# Patient Record
Sex: MALE | Race: BLACK OR AFRICAN AMERICAN | NOT HISPANIC OR LATINO | Employment: STUDENT | URBAN - METROPOLITAN AREA
[De-identification: names, ages, dates, MRNs, and addresses within clinical notes are randomized per-mention and may not be internally consistent; named-entity substitution may affect disease eponyms.]

---
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
## 2021-04-16 ENCOUNTER — PATIENT MESSAGE (OUTPATIENT)
Dept: RESEARCH | Facility: HOSPITAL | Age: 21
End: 2021-04-16

## 2021-05-14 ENCOUNTER — TELEPHONE (OUTPATIENT)
Dept: ORTHOPEDICS | Facility: CLINIC | Age: 21
End: 2021-05-14

## 2023-03-16 ENCOUNTER — OFFICE VISIT (OUTPATIENT)
Dept: SPORTS MEDICINE | Facility: CLINIC | Age: 23
End: 2023-03-16
Payer: MEDICAID

## 2023-03-16 ENCOUNTER — HOSPITAL ENCOUNTER (OUTPATIENT)
Dept: RADIOLOGY | Facility: HOSPITAL | Age: 23
Discharge: HOME | End: 2023-03-16
Attending: FAMILY MEDICINE
Payer: MEDICAID

## 2023-03-16 VITALS
BODY MASS INDEX: 28.23 KG/M2 | DIASTOLIC BLOOD PRESSURE: 82 MMHG | HEIGHT: 70 IN | SYSTOLIC BLOOD PRESSURE: 126 MMHG | HEART RATE: 63 BPM | WEIGHT: 197.19 LBS

## 2023-03-16 DIAGNOSIS — M79.641 RIGHT HAND PAIN: ICD-10-CM

## 2023-03-16 DIAGNOSIS — M79.645 CHRONIC THUMB PAIN, LEFT: Primary | ICD-10-CM

## 2023-03-16 DIAGNOSIS — G89.29 CHRONIC THUMB PAIN, LEFT: Primary | ICD-10-CM

## 2023-03-16 DIAGNOSIS — M79.642 LEFT HAND PAIN: ICD-10-CM

## 2023-03-16 DIAGNOSIS — R29.898 DECREASED GRIP STRENGTH OF LEFT HAND: ICD-10-CM

## 2023-03-16 PROCEDURE — 3079F PR MOST RECENT DIASTOLIC BLOOD PRESSURE 80-89 MM HG: ICD-10-PCS | Mod: CPTII,,, | Performed by: FAMILY MEDICINE

## 2023-03-16 PROCEDURE — 3008F BODY MASS INDEX DOCD: CPT | Mod: CPTII,,, | Performed by: FAMILY MEDICINE

## 2023-03-16 PROCEDURE — 73130 X-RAY EXAM OF HAND: CPT | Mod: 26,LT,, | Performed by: RADIOLOGY

## 2023-03-16 PROCEDURE — 99213 OFFICE O/P EST LOW 20 MIN: CPT | Mod: PBBFAC | Performed by: FAMILY MEDICINE

## 2023-03-16 PROCEDURE — 99214 OFFICE O/P EST MOD 30 MIN: CPT | Mod: S$PBB,,, | Performed by: FAMILY MEDICINE

## 2023-03-16 PROCEDURE — 3074F PR MOST RECENT SYSTOLIC BLOOD PRESSURE < 130 MM HG: ICD-10-PCS | Mod: CPTII,,, | Performed by: FAMILY MEDICINE

## 2023-03-16 PROCEDURE — 3079F DIAST BP 80-89 MM HG: CPT | Mod: CPTII,,, | Performed by: FAMILY MEDICINE

## 2023-03-16 PROCEDURE — 73130 XR HAND COMPLETE 3 VIEW LEFT: ICD-10-PCS | Mod: 26,LT,, | Performed by: RADIOLOGY

## 2023-03-16 PROCEDURE — 99999 PR PBB SHADOW E&M-EST. PATIENT-LVL III: ICD-10-PCS | Mod: PBBFAC,,, | Performed by: FAMILY MEDICINE

## 2023-03-16 PROCEDURE — 1160F RVW MEDS BY RX/DR IN RCRD: CPT | Mod: CPTII,,, | Performed by: FAMILY MEDICINE

## 2023-03-16 PROCEDURE — 3074F SYST BP LT 130 MM HG: CPT | Mod: CPTII,,, | Performed by: FAMILY MEDICINE

## 2023-03-16 PROCEDURE — 73130 X-RAY EXAM OF HAND: CPT | Mod: TC,LT

## 2023-03-16 PROCEDURE — 99214 PR OFFICE/OUTPT VISIT, EST, LEVL IV, 30-39 MIN: ICD-10-PCS | Mod: S$PBB,,, | Performed by: FAMILY MEDICINE

## 2023-03-16 PROCEDURE — 99999 PR PBB SHADOW E&M-EST. PATIENT-LVL III: CPT | Mod: PBBFAC,,, | Performed by: FAMILY MEDICINE

## 2023-03-16 PROCEDURE — 3008F PR BODY MASS INDEX (BMI) DOCUMENTED: ICD-10-PCS | Mod: CPTII,,, | Performed by: FAMILY MEDICINE

## 2023-03-16 PROCEDURE — 1160F PR REVIEW ALL MEDS BY PRESCRIBER/CLIN PHARMACIST DOCUMENTED: ICD-10-PCS | Mod: CPTII,,, | Performed by: FAMILY MEDICINE

## 2023-03-16 PROCEDURE — 1159F PR MEDICATION LIST DOCUMENTED IN MEDICAL RECORD: ICD-10-PCS | Mod: CPTII,,, | Performed by: FAMILY MEDICINE

## 2023-03-16 PROCEDURE — 1159F MED LIST DOCD IN RCRD: CPT | Mod: CPTII,,, | Performed by: FAMILY MEDICINE

## 2023-03-16 RX ORDER — MELOXICAM 7.5 MG/1
7.5 TABLET ORAL DAILY
Qty: 15 TABLET | Refills: 0 | Status: SHIPPED | OUTPATIENT
Start: 2023-03-16

## 2023-03-16 NOTE — PROGRESS NOTES
Pedro Morales, a 22 y.o. male, is here for evaluation of hand pain / 1st digit (thumb) left    HISTORY OF PRESENT ILLNESS  Hand dominance: right    Location: 1st digit (thumb)   Onset: sudden   Palliative:    Relative rest   Oral analgesics     Provocative:   Active RoM  Prior:   Progression: plateau discomfort  Quality: sharp    Radiation: Denies neck pain, numbness, and tingling in fingertips.  Severity: per nursing documentation  Timing: intermittent w/ use  Trauma:     Review of systems (ROS):  A 10+ review of systems was performed with pertinent positives and negatives noted above in the history of present illness. Other systems were negative unless otherwise specified.    PHYSICAL EXAMINATION  General:  The patient is alert and oriented x 3. Mood is pleasant. Observation of ears, eyes and nose reveal no gross abnormalities. HEENT: NCAT, sclera anicteric. Lungs: Respirations are equal and unlabored.    Wrist/Hand Exam/1st digit (thumb)    Observation:     Swelling  none  Deformity  none   Discoloration  none   Atrophy  none   Scars   none             Erythema                    none    Tenderness:  Radial:  DRUJ    Neg  Radial Styloid   Neg  Radial Shaft                            Neg  1st dorsal Compartment Neg  Jonelle's Tubercle  Neg  Basal Joint Thumb  Neg  ECRL Tendon   Neg  FCR Tendon   Neg  Snuff Box   Neg  Lunate    Neg     Ulnar:  ECU Sheath   Neg  FCU Tendon   Neg  Pisiform   Neg  TFCC    Neg  Ulnar Styloid   Neg  Ulnar Shaft   Neg    Hand:  Palmar Fascia   Neg  Metacarpal   Neg  MCP    Neg  Phalanx   Neg  PIP    Neg  DIP    Neg  A1- Pulley   Neg  Flexor Tendons  Neg  Finger Tip   Neg    ROM: (AROM)  Right    Left        Wrist Flexion   80°       80°     Wrist Extension   75°      75°  Radial Deviation  30°     30°   Ulnar Deviation  30°      30°     Pronation   90     90  Supination   90    90    Strength:   RIGHT    LEFT   Wrist Flexion   5/5    5/5   Wrist Extension  5/5    5/5  Hand     5/5    5/5  Opposition   5/5    5/5    Special tests:  Phalens     Neg  Durkan Carpal Compression   Neg  Tinel (wrist)     Neg  Finkelstein     Neg  Piano Cooper (ulnar translation)   Neg  Bass Scaphoid Shift   Neg  Richard Test     Normal  Froment Sign     Neg  CMC Grind     Neg  Melany (Intrinsic Tightness)   Neg     Extremity Neuro-vascular Testing: Sensation grossly intact to light touch all dermatomal regions. DTR 2+ Biceps, Triceps, BR and Negative Vick's sign. Grossly intact motor function at Elbow, Wrist and Hand. Distal pulses radial and ulnar 2+, brisk cap refill, symmetric.    Other Findings:    ASSESSMENT & PLAN   Assessment  #1 1st digit (thumb), MCP joint, left /nd   Traumatic   Failing conservative therapies   W/out instability    No evidence of osseous pathology  No evidence of neurologic pathology  No evidence of vascular pathology    Imaging studies reviewed:   X-ray wrist/hand, left 23.03    Plan  Given extreme dysfunction and discomfort, and non-diagnostic x-ray imaging, we will obtain MRI imaging for further evaluation of the soft tissue structures of the LEFT 1ST MCP JOINT.    We discussed options including    Watchful waiting / relative rest x   Physical therapy x   Injection therapy    Consultation    The patient chooses As above   x = prescribed  CSI = corticosteroid injection  VSI = viscosupplement injection  PRPI = platelet rich plasma injection  ia = intra articular  R = right  L = left  B = bilateral   nfSx = surgical consultation was recommended, but patient is not interested in consultation at this time    Physical Therapy        Formal (fPT), @ [facility] facility    Formal (fPT), @ [facility] facility        Homegoing (hgPT), per concurrent fPT recommendations    Homegoing (hgPT), per prior fPT recommendations    Homegoing (hgPT), handout provided        w/  (atPT) t   [blank] = not prescribed  x = prescribed  b = prescribed, and begin as indicated  t = continue as  indicated  r = prescribed, and restart as indicated  p = completed prior as indicated  hs = prescribed, and with high school   col = prescribed, and with college or university   nfPT = physical therapy was recommended, but patient is not interested in PT at this time    Activity (e.g. sports, work) restrictions    [blank] = as tolerated  pt = per physical therapist  at = per     Bracing Thumb spica brace, f   [blank] = not prescribed  r = recommended, but not fit with at todays visit  f = prescribed and fit with at todays visit  t = continue as indicated  p = prn use on rare, as-needed basis; advised against chronic use    Pain management m   [blank] = No prescription necessary. A handout detailing dosing of appropriate   over-the-counter musculoskeletal analgesics was made available to the patient.   m = meloxicam x 14 days  mp = 14 day course of meloxicam prescribed prior    Follow up mri   [blank] = as needed  [number] = in [number] weeks  CSI = for corticosteroid injection  VSI = for viscosupplement injection or injection series  PRP = for platelet rich plasma injection or injection series  MRI = after MRI imaging  ns = should surgical options be deferred (no surgery)  o = appointment offered, deferred by patient    Should symptoms worsen or fail to resolve, consider    Revisiting the above options and / or      Vocation:

## 2023-05-22 ENCOUNTER — TELEPHONE (OUTPATIENT)
Dept: SPORTS MEDICINE | Facility: CLINIC | Age: 23
End: 2023-05-22
Payer: MEDICAID

## 2023-05-22 NOTE — TELEPHONE ENCOUNTER
----- Message from Martha Curry Cortez sent at 5/22/2023 10:32 AM CDT -----  Regarding: appt; f/u  Contact: April (mom) @ [phone]  Jean-Paul (dad) called in to schedule an appt; no available appts in Epic. Pt is asking for a call back soon to schedule. Thanks.    Reason appt not schedule: none available in Epic    Patient's DX: n/a    Patient requesting call back or [facility] msg: please contact April (mom) by phone to r/s.

## 2023-07-11 ENCOUNTER — PATIENT MESSAGE (OUTPATIENT)
Dept: RESEARCH | Facility: HOSPITAL | Age: 23
End: 2023-07-11
Payer: MEDICAID

## 2023-10-20 ENCOUNTER — OFFICE VISIT (OUTPATIENT)
Dept: SPORTS MEDICINE | Facility: CLINIC | Age: 23
End: 2023-10-20
Payer: MEDICAID

## 2023-10-20 ENCOUNTER — HOSPITAL ENCOUNTER (OUTPATIENT)
Dept: RADIOLOGY | Facility: HOSPITAL | Age: 23
Discharge: HOME | End: 2023-10-20
Attending: PHYSICIAN ASSISTANT
Payer: MEDICAID

## 2023-10-20 VITALS
BODY MASS INDEX: 26.12 KG/M2 | WEIGHT: 182.44 LBS | HEART RATE: 66 BPM | SYSTOLIC BLOOD PRESSURE: 117 MMHG | DIASTOLIC BLOOD PRESSURE: 72 MMHG | HEIGHT: 70 IN

## 2023-10-20 DIAGNOSIS — M25.561 RIGHT KNEE PAIN, UNSPECIFIED CHRONICITY: ICD-10-CM

## 2023-10-20 DIAGNOSIS — M21.862 HYPEREXTENSION DEFORMITY OF KNEE, LEFT: Primary | ICD-10-CM

## 2023-10-20 DIAGNOSIS — M25.562 LEFT KNEE PAIN, UNSPECIFIED CHRONICITY: ICD-10-CM

## 2023-10-20 DIAGNOSIS — M25.562 ACUTE PAIN OF LEFT KNEE: ICD-10-CM

## 2023-10-20 PROCEDURE — 73564 X-RAY EXAM KNEE 4 OR MORE: CPT | Mod: TC,50

## 2023-10-20 PROCEDURE — 3074F PR MOST RECENT SYSTOLIC BLOOD PRESSURE < 130 MM HG: ICD-10-PCS | Mod: CPTII,,, | Performed by: PHYSICIAN ASSISTANT

## 2023-10-20 PROCEDURE — 99999 PR PBB SHADOW E&M-EST. PATIENT-LVL III: CPT | Mod: PBBFAC,,, | Performed by: PHYSICIAN ASSISTANT

## 2023-10-20 PROCEDURE — 3008F PR BODY MASS INDEX (BMI) DOCUMENTED: ICD-10-PCS | Mod: CPTII,,, | Performed by: PHYSICIAN ASSISTANT

## 2023-10-20 PROCEDURE — 73564 XR KNEE ORTHO BILAT WITH FLEXION: ICD-10-PCS | Mod: 26,50,, | Performed by: RADIOLOGY

## 2023-10-20 PROCEDURE — 1160F RVW MEDS BY RX/DR IN RCRD: CPT | Mod: CPTII,,, | Performed by: PHYSICIAN ASSISTANT

## 2023-10-20 PROCEDURE — 3008F BODY MASS INDEX DOCD: CPT | Mod: CPTII,,, | Performed by: PHYSICIAN ASSISTANT

## 2023-10-20 PROCEDURE — 3078F DIAST BP <80 MM HG: CPT | Mod: CPTII,,, | Performed by: PHYSICIAN ASSISTANT

## 2023-10-20 PROCEDURE — 1160F PR REVIEW ALL MEDS BY PRESCRIBER/CLIN PHARMACIST DOCUMENTED: ICD-10-PCS | Mod: CPTII,,, | Performed by: PHYSICIAN ASSISTANT

## 2023-10-20 PROCEDURE — 1159F PR MEDICATION LIST DOCUMENTED IN MEDICAL RECORD: ICD-10-PCS | Mod: CPTII,,, | Performed by: PHYSICIAN ASSISTANT

## 2023-10-20 PROCEDURE — 3074F SYST BP LT 130 MM HG: CPT | Mod: CPTII,,, | Performed by: PHYSICIAN ASSISTANT

## 2023-10-20 PROCEDURE — 99214 PR OFFICE/OUTPT VISIT, EST, LEVL IV, 30-39 MIN: ICD-10-PCS | Mod: S$PBB,,, | Performed by: PHYSICIAN ASSISTANT

## 2023-10-20 PROCEDURE — 73564 X-RAY EXAM KNEE 4 OR MORE: CPT | Mod: 26,50,, | Performed by: RADIOLOGY

## 2023-10-20 PROCEDURE — 1159F MED LIST DOCD IN RCRD: CPT | Mod: CPTII,,, | Performed by: PHYSICIAN ASSISTANT

## 2023-10-20 PROCEDURE — 3078F PR MOST RECENT DIASTOLIC BLOOD PRESSURE < 80 MM HG: ICD-10-PCS | Mod: CPTII,,, | Performed by: PHYSICIAN ASSISTANT

## 2023-10-20 PROCEDURE — 99214 OFFICE O/P EST MOD 30 MIN: CPT | Mod: S$PBB,,, | Performed by: PHYSICIAN ASSISTANT

## 2023-10-20 PROCEDURE — 99213 OFFICE O/P EST LOW 20 MIN: CPT | Mod: PBBFAC | Performed by: PHYSICIAN ASSISTANT

## 2023-10-20 PROCEDURE — 99999 PR PBB SHADOW E&M-EST. PATIENT-LVL III: ICD-10-PCS | Mod: PBBFAC,,, | Performed by: PHYSICIAN ASSISTANT

## 2023-10-20 NOTE — PROGRESS NOTES
Subjective:     Chief Complaint: Pedro Morales is a 23 y.o. male who had concerns including Pain of the Left Knee.    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 The pain started 2 weeks ago when he hyperextended his knee in practice.  He started treatment with the  at school, he did notice he had an effusion in his knee which was drained 1 week ago.  Since then he is slowly been making progress with minimal pain in the knee. Pain is located over (points to) lateral joint line and posterior knee. He reports that the pain is a 0 /10 aching pain today. Associated symptoms include swelling. Pain is responding adequately to conservative measures which have included activity modifications, PT, rest, and oral medication. Is affecting ADLs and limiting desired level of activity. Denies numbness, tingling, radiation and inability to bear weight.  Pain is 5 /10 at its worst    Mechanical symptoms: none  Subjective instability: (--)   Worse with increased activity  Better with rest.   Nocturnal symptoms: (--)    No previous surgeries or recent trauma on knee                  Review of Systems   Constitutional: Negative for chills and fever.   HENT:  Negative for congestion and sore throat.    Eyes:  Negative for discharge and double vision.   Cardiovascular:  Negative for chest pain, palpitations and syncope.   Respiratory:  Negative for cough and shortness of breath.    Endocrine: Negative for cold intolerance and heat intolerance.   Skin:  Negative for dry skin and rash.   Musculoskeletal:  Positive for joint pain and joint swelling.   Gastrointestinal:  Negative for abdominal pain, nausea and vomiting.   Neurological:  Negative for focal weakness, numbness and paresthesias.       Pain Related Questions  Over the past 3 days, what was your average pain during activity? (I.e. running, jogging, walking, climbing stairs, getting dressed, ect.): 5  Over the past 3 days, what was your  highest pain level?: 5  Over the past 3 days, what was your lowest pain level? : 3    Other  How many nights a week are you awakened by your affected body part?: 0  Was the patient's HEIGHT measured or patient reported?: Patient Reported  Was the patient's WEIGHT measured or patient reported?: Measured    Objective:     General: Pedro is well-developed, well-nourished, appears stated age, in no acute distress, alert and oriented to time, place and person.     General    Nursing note and vitals reviewed.  Constitutional: He is oriented to person, place, and time. He appears well-developed and well-nourished. No distress.   Eyes: Conjunctivae and EOM are normal. Pupils are equal, round, and reactive to light.   Neck: Neck supple. No JVD present.   Cardiovascular:  Normal heart sounds and intact distal pulses.            No murmur heard.  Pulmonary/Chest: Effort normal and breath sounds normal. No respiratory distress.   Abdominal: Soft. Bowel sounds are normal. He exhibits no distension. There is no abdominal tenderness.   Neurological: He is alert and oriented to person, place, and time. Coordination normal.   Psychiatric: He has a normal mood and affect. His behavior is normal. Judgment and thought content normal.     General Musculoskeletal Exam   Gait: normal       Right Knee Exam     Inspection   Erythema: absent  Scars: absent  Swelling: absent  Effusion: absent  Deformity: absent  Bruising: absent    Tenderness   The patient is experiencing no tenderness.     Range of Motion   Extension:  -5   Flexion:  140     Tests   Meniscus   Nika:  Medial - negative Lateral - negative  Ligament Examination   Lachman: normal (-1 to 2mm)   PCL-Posterior Drawer: normal (0 to 2mm)     MCL - Valgus: normal (0 to 2mm)  LCL - Varus: normal  Dial Test at 90 degrees: normal (< 5 degrees)  Posterior Sag Test: negative  Posterolateral Corner: stable  Patella   Patellar Glide (quadrants): Lateral - 1   Medial - 2  Patellar Grind:  negative    Other   Popliteal (Baker's) Cyst: absent  Sensation: normal    Left Knee Exam     Inspection   Erythema: absent  Scars: absent  Swelling: absent  Effusion: absent  Deformity: absent  Bruising: absent    Tenderness   The patient is experiencing no tenderness.     Range of Motion   Extension:  0 (+pain) abnormal   Flexion:  130     Tests   Meniscus   Nika:  Medial - negative Lateral - negative  Stability   Lachman: normal (-1 to 2mm)   PCL-Posterior Drawer: normal (0 to 2mm)  MCL - Valgus: normal (0 to 2mm)  LCL - Varus: normal (0 to 2mm)  Pivot Shift: normal (Equal)  Dial Test at 90 degrees: normal (< 5 degrees)  Posterior Sag Test: negative  Posterolateral Corner: stable  Patella   Patellar Glide (Quadrants): Lateral - 1 Medial - 2  Patellar Grind: negative    Other   Popliteal (Baker's) Cyst: absent  Sensation: normal    Right Hip Exam     Tests   Mirta: negative  Left Hip Exam     Tests   Mirta: negative          Muscle Strength   Right Lower Extremity   Hip Abduction: 5/5   Quadriceps:  5/5   Hamstrin/5   Left Lower Extremity   Hip Abduction: 5/5   Quadriceps:  5/5   Hamstrin/5     Vascular Exam     Right Pulses  Dorsalis Pedis:      2+  Posterior Tibial:      2+        Left Pulses  Dorsalis Pedis:      2+  Posterior Tibial:      2+        Edema  Right Lower Leg: absent  Left Lower Leg: absent    Radiographic findings today:    Joint spaces are maintained.  No evidence of acute fracture or dislocation.  No soft tissue abnormality.  No joint effusion.     Xrays of the bilateral knees were ordered and reviewed by me today. These findings were discussed and reviewed with the patient.      Assessment:     Encounter Diagnoses   Name Primary?    Hyperextension deformity of knee, left Yes    Acute pain of left knee         Plan:     We have discussed a variety of treatment options including medications, injections, physical therapy and other alternative treatments. I also explained the  indications, risks and benefits of surgery. Patient is improving with conservative management. Recommending continued observation at this time. Patient agrees with treatment plan.    1. RICE - Ice compress to the affected area 2-3x a day for 15-20 minutes as needed for pain management.  2. Sport activities as tolerated, continue rehab with , we will consider MRI if symptoms worsen.  3. RTC to see Vaibhav Negro PA-C as needed for follow-up.    All of the patient's questions were answered and the patient will contact us if they have any questions or concerns in the interim.      Patient questionnaires may have been collected.

## 2023-12-05 DIAGNOSIS — S83.212A BUCKET-HANDLE TEAR OF MEDIAL MENISCUS OF LEFT KNEE AS CURRENT INJURY, INITIAL ENCOUNTER: Primary | ICD-10-CM

## 2023-12-18 ENCOUNTER — TELEPHONE (OUTPATIENT)
Dept: SPORTS MEDICINE | Facility: CLINIC | Age: 23
End: 2023-12-18
Payer: MEDICAID

## 2023-12-18 ENCOUNTER — CLINICAL SUPPORT (OUTPATIENT)
Dept: REHABILITATION | Facility: HOSPITAL | Age: 23
End: 2023-12-18
Payer: MEDICAID

## 2023-12-18 DIAGNOSIS — S83.212A BUCKET-HANDLE TEAR OF MEDIAL MENISCUS OF LEFT KNEE AS CURRENT INJURY, INITIAL ENCOUNTER: Primary | ICD-10-CM

## 2023-12-18 DIAGNOSIS — M25.662 DECREASED RANGE OF MOTION (ROM) OF LEFT KNEE: ICD-10-CM

## 2023-12-18 DIAGNOSIS — M25.562 PAIN IN LATERAL PORTION OF LEFT KNEE: ICD-10-CM

## 2023-12-18 PROCEDURE — 97110 THERAPEUTIC EXERCISES: CPT

## 2023-12-18 PROCEDURE — 97161 PT EVAL LOW COMPLEX 20 MIN: CPT

## 2023-12-18 NOTE — TELEPHONE ENCOUNTER
Telephone w/ pt's mother AR  Pt had arthros knee surg in [place], [place] last Fri 12/15  He is doing post op fPT here at [facility]  He has f/u w/ surgeon in [place] mid Jan 2024  I told the family to reach out to us if pt has any trouble   But otherwise to f/u per the surgical team    30 min later:  Met w/ pt and his father as pt awaited fPT appt  Reviewed what pt's mother and I had discussed  All questions were answered  Pt was encouraged to contact me / my clinic / [facility]  Should any problems or questions arise

## 2023-12-18 NOTE — PLAN OF CARE
[facility]   Physical Therapy Initial Evaluation      Name: Pedro Morales  Clinic Number: [id number]    Therapy Diagnosis:   Encounter Diagnoses   Name Primary?    Bucket-handle tear of medial meniscus of left knee as current injury, initial encounter Yes    Pain in lateral portion of left knee     Decreased range of motion (ROM) of left knee         Physician: Filemon Jacobs MD    Physician Orders: PT Eval and Treat   Medical Diagnosis from Referral: Bucket-handle tear of medial meniscus of left knee as current injury, initial encounter  Evaluation Date: 12/18/2023  Authorization Period Expiration: 12/05/2023 - 12/04/2024  Plan of Care Expiration: 3/18/24  Progress Note Due: 1/18/25  Visit # / Visits authorized: 1 / 1    FOTO: 40%  FOTO 2:   FOTO 3:  DC FOTO @: 74%      Precautions: Standard     Time In: 1000  Time Out: 1100  Total Appointment Time (timed & untimed codes): 60 minutes    Subjective     Date of onset: 12/15/23    History of current condition - Pedro reports: had surgery in [place] where he just graduated college and where he will be returning in 3 weeks to finish his rehab and 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 Pt states he had been feeling great since surgery but last night while he was going to the bathroom he fell and twisted his knee. He states it is more swollen since he fell. Not really using crutches until falling last night. Dad reports no narcotics, just has been taking 2 advils and pain is well controlled. He talked to Dr. Gloria about him falling just now.    Falls: yes last night    Imaging:  See chart    Prior Therapy: none  Social History: just graduated from college, plans to attend [employer]   Occupation: see above  Prior Level of Function: no pain   Current Level of Function: as expected post op knee    Pain:  Current 5/10, worst 5/10, best 5/10   Location: L knee  Description: POST-OP SORENESS, WEAKNESS  Aggravating Factors: twisting,  "transfers   Easing Factors: ice, nsaids    Patients goals: full ROM and quad activation before returning to [place]     Medical History:   Past Medical History:   Diagnosis Date    Asthma        Surgical History:   Pedro Morales  has a past surgical history that includes Tonsillectomy and Adenoidectomy.    Medications:   Pedro has a current medication list which includes the following prescription(s): ibuprofen and meloxicam.    Allergies:   Review of patient's allergies indicates:   Allergen Reactions    Pcn [penicillins] Hives        Objective        Observation: surgical dressings in tact; pt wearing bilat compression stockings;     Functional Tests:  Gait: ambulating WBAT RLE with michael axillary crutches; cueing for heel strike with knee ext and knee flexion during loading response; cued pt to pushing through arms during unaffected swing phase to help unload affected leg in SLS    Knee Passive Range of Motion:   Right  Left    Flexion 145 82   Extension 15 hyper 1 hyper       Quad Set: fair-poor; unable to lift heel with towel under knee, SLR unable    Joint Mobility: patellar/fat pad mobility = stiff secondary to swelling    Palpation: no pitting edema noted in LLE, harder edema in distal quad    Sensation: in tact light touch LLE, though diminished around  L knee ; in tact light touch R LE     Pulses:  Dorsalis Pedis a. = in tact LLE   Posterior Tib a. = in tact LLE    Edema: non-pitting RLE;    5 cm below Joint Line 5 cm above    Left  35 41 44   Right  35 35 37         Limitation/Restriction for FOTO KNEE Survey    Therapist reviewed FOTO scores for Pedro Morales on 12/18/2023.   FOTO documents entered into [employer] - see Media section.    Limitation Score: 40%         Treatment     Total Treatment time (time-based codes) separate from Evaluation: 30 minutes     Pedro received the treatments listed below:      Heel prop 5' before and after session  Mosotho L quad Quad sets 10" on/10" off  Ankle pumps " "20x  Calf stretch w/ towel 3 x 30"  EOM PROM knee flexion with PT  Heel slides with strap and board 30x  Gait training with bilateral axillary crutches    Pedro received cold pack for 10 minutes to L knee.      Patient Education and Home Exercises     Education provided:   - HEP  - infection prevention: keep incisions covered with water proof bandaids and do not get incisions wet. Recommended to leave leg out of shower until incisions are fully closed.  - prop ankle every waking hour to assist obtaining complete knee extension   - ice and elevate above heart level 3-5x/day  - perform 100 quad sets every waking hour  - do not sleep with knee flexed (I.e.putting something under knee)      Written Home Exercises Provided: yes. Exercises were reviewed and Pedro was able to demonstrate them prior to the end of the session.  Ceed demonstrated good  understanding of the education provided. See EMR under Patient Instructions for exercises provided during therapy sessions.    Assessment     Pedro is a 23 y.o. male referred to outpatient Physical Therapy with a medical diagnosis of Bucket-handle tear of medial meniscus of left knee as current injury, initial encounter. Patient presents POD 3 with siginifcant swelling in left knee after tripping and falling overnight on his way to bathroom. Pt presents with swelling, decreased knee ROM, decreased quad activation, and gait deviations secondary to above mentioned deficits.     Patient prognosis is Excellent.   Patient will benefit from skilled outpatient Physical Therapy to address the deficits stated above and in the chart below, provide patient /family education, and to maximize patientt's level of independence.     Plan of care discussed with patient: Yes  Patient's spiritual, cultural and educational needs considered and patient is agreeable to the plan of care and goals as stated below:     Anticipated Barriers for therapy: none    Medical Necessity is demonstrated by the " following  History  Co-morbidities and personal factors that may impact the plan of care [x] LOW: no personal factors / co-morbidities  [] MODERATE: 1-2 personal factors / co-morbidities  [] HIGH: 3+ personal factors / co-morbidities    Moderate / High Support Documentation:   Co-morbidities affecting plan of care: see chart    Personal Factors:   no deficits     Examination  Body Structures and Functions, activity limitations and participation restrictions that may impact the plan of care [x] LOW: addressing 1-2 elements  [] MODERATE: 3+ elements  [] HIGH: 4+ elements (please support below)    Moderate / High Support Documentation:      Clinical Presentation [x] LOW: stable  [] MODERATE: Evolving  [] HIGH: Unstable     Decision Making/ Complexity Score: low       Goals:  Short Term Goals: 6 weeks   1. Independent with HEP  2. Increase pain-free L knee extension ROM to >0 degrees   3. Increase pain-free L knee flexion ROM to >/=135 degrees   4. Improve gait (discharge AD) when safe  5. Ability to perform SLR with no lag     Long Term Goals: 10 weeks   1. Increase pain-free L knee ROM to WNL   2. Improve Quad strength to at least 3+/5   3. Pain-free gait with or without AD   4. Pain-free stairs      Plan     Plan of care Certification: 12/18/2023 to 3/18/24.    Outpatient Physical Therapy 3 times weekly for 3 weeks to include the following interventions: Gait Training, Manual Therapy, Moist Heat/ Ice, Neuromuscular Re-ed, Patient Education, Self Care, Therapeutic Activities, Therapeutic Exercise.     Stephanie Hernandez, PT

## 2023-12-20 ENCOUNTER — CLINICAL SUPPORT (OUTPATIENT)
Dept: REHABILITATION | Facility: HOSPITAL | Age: 23
End: 2023-12-20
Payer: MEDICAID

## 2023-12-20 DIAGNOSIS — M25.662 DECREASED RANGE OF MOTION (ROM) OF LEFT KNEE: ICD-10-CM

## 2023-12-20 DIAGNOSIS — M25.562 PAIN IN LATERAL PORTION OF LEFT KNEE: Primary | ICD-10-CM

## 2023-12-20 PROCEDURE — 97110 THERAPEUTIC EXERCISES: CPT

## 2023-12-20 NOTE — PROGRESS NOTES
"[facility]   Physical Therapy Treatment Note      Name: Pedro Morales  Clinic Number: [id number]    Therapy Diagnosis:   Encounter Diagnoses   Name Primary?    Pain in lateral portion of left knee Yes    Decreased range of motion (ROM) of left knee      Physician: Filemon Jacobs MD    Visit Date: 12/20/2023    Physician Orders: PT Eval and Treat   Medical Diagnosis from Referral: Bucket-handle tear of medial meniscus of left knee as current injury, initial encounter  Evaluation Date: 12/18/2023  Authorization Period Expiration: 12/31/2023  Plan of Care Expiration: 3/18/24  Progress Note Due: 1/18/25  Visit # / Visits authorized: 1 / 8     FOTO: 40%  FOTO 2:   FOTO 3:  DC FOTO @: 74%      PTA Visit #: 0/5     Time In: 1004  Time Out: 1114  Total Billable Time: 70 minutes, 60 min 1:1    Subjective     Pt reports: woke up at 4 am and has been up since. Not taking pain meds just nsaids  He was compliant with home exercise program.  Response to previous treatment: improved pain   Functional change: improved gait    Pain: 7/10  Location: L knee    Objective      Objective Measures updated at progress report unless specified.     Treatment     Pedro received the treatments listed below:      *Billed as therex due to medicaid guidelines*     ePdro received therapeutic exercises to develop strength, endurance, ROM, flexibility, posture and core stabilization for 60 minutes including:     Heel prop 4# above knee 5' before and after session  Fat pad/Patellar mobs in varying degrees of flexion/extension - all directions  Extension overpressure  Extension hinge mob   Calf stretch w/ towel 3 x 30"  Hamstring stretch off mat 3 x 30"  Russian L quad 10" on/10" off  Quad sets 5'  Quad sets into hyperextension with strap (towel roll under knee) 5'   S/L SLR 5'   Standing Lift offs 5'  EOM PROM knee flexion with PT  Heel slides with strap and board 30x    Pedro received cold pack for 10 minutes to L " Knee.     Patient Education and Home Exercises       Education provided:   - HEP  - infection prevention: keep incisions covered with water proof bandaids and do not get incisions wet. Recommended to leave leg out of shower until incisions are fully closed.  - prop ankle every waking hour to assist obtaining complete knee extension   - ice and elevate above heart level 3-5x/day  - perform 100 quad sets every waking hour  - do not sleep with knee flexed (I.e.putting something under knee)    Written Home Exercises Provided: Patient instructed to cont prior HEP. Exercises were reviewed and Pedro was able to demonstrate them prior to the end of the session.  Pedro demonstrated good  understanding of the education provided. See EMR under Patient Instructions for exercises provided during therapy sessions    Assessment     Pt presents still swollen distal quad with decreased patellar mobility. He was unable to SLR in modified and supine but was able to perform in side lying and standing hip abd. His flexion is a little limited, he got to 95 degrees today, likely due to swelling.    Pedro Is progressing well towards his goals.   Pt prognosis is Excellent.     Pt will continue to benefit from skilled outpatient physical therapy to address the deficits listed in the problem list box on initial evaluation, provide pt/family education and to maximize pt's level of independence in the home and community environment.     Pt's spiritual, cultural and educational needs considered and pt agreeable to plan of care and goals.     Anticipated barriers to physical therapy: none    Goals:   Short Term Goals: 6 weeks   1. Independent with HEP  2. Increase pain-free L knee extension ROM to >0 degrees   3. Increase pain-free L knee flexion ROM to >/=135 degrees   4. Improve gait (discharge AD) when safe  5. Ability to perform SLR with no lag     Long Term Goals: 10 weeks   1. Increase pain-free L knee ROM to WNL   2. Improve Quad strength  to at least 3+/5   3. Pain-free gait with or without AD   4. Pain-free stairs    Plan     Continue with PT YURI Hernandez, PT

## 2023-12-21 ENCOUNTER — CLINICAL SUPPORT (OUTPATIENT)
Dept: REHABILITATION | Facility: HOSPITAL | Age: 23
End: 2023-12-21
Payer: MEDICAID

## 2023-12-21 ENCOUNTER — OFFICE VISIT (OUTPATIENT)
Dept: SPORTS MEDICINE | Facility: CLINIC | Age: 23
End: 2023-12-21
Payer: MEDICAID

## 2023-12-21 DIAGNOSIS — Z09 SURGERY FOLLOW-UP EXAMINATION: ICD-10-CM

## 2023-12-21 DIAGNOSIS — M25.562 PAIN IN LATERAL PORTION OF LEFT KNEE: Primary | ICD-10-CM

## 2023-12-21 DIAGNOSIS — Z98.890 S/P ARTHROSCOPIC SURGERY OF LEFT KNEE: ICD-10-CM

## 2023-12-21 DIAGNOSIS — M25.562 ACUTE POSTOPERATIVE PAIN OF LEFT KNEE: Primary | ICD-10-CM

## 2023-12-21 DIAGNOSIS — M25.462 EFFUSION OF LEFT KNEE JOINT: ICD-10-CM

## 2023-12-21 DIAGNOSIS — G89.18 ACUTE POSTOPERATIVE PAIN OF LEFT KNEE: Primary | ICD-10-CM

## 2023-12-21 DIAGNOSIS — M25.662 DECREASED RANGE OF MOTION (ROM) OF LEFT KNEE: ICD-10-CM

## 2023-12-21 PROCEDURE — 99999 PR PBB SHADOW E&M-EST. PATIENT-LVL II: CPT | Mod: PBBFAC,,, | Performed by: PHYSICIAN ASSISTANT

## 2023-12-21 PROCEDURE — 99999 PR PBB SHADOW E&M-EST. PATIENT-LVL II: ICD-10-PCS | Mod: PBBFAC,,, | Performed by: PHYSICIAN ASSISTANT

## 2023-12-21 PROCEDURE — 1160F PR REVIEW ALL MEDS BY PRESCRIBER/CLIN PHARMACIST DOCUMENTED: ICD-10-PCS | Mod: CPTII,,, | Performed by: PHYSICIAN ASSISTANT

## 2023-12-21 PROCEDURE — 1160F RVW MEDS BY RX/DR IN RCRD: CPT | Mod: CPTII,,, | Performed by: PHYSICIAN ASSISTANT

## 2023-12-21 PROCEDURE — 20610 LARGE JOINT ASPIRATION/INJECTION: L KNEE: ICD-10-PCS | Mod: S$PBB,LT,, | Performed by: PHYSICIAN ASSISTANT

## 2023-12-21 PROCEDURE — 99212 OFFICE O/P EST SF 10 MIN: CPT | Mod: PBBFAC | Performed by: PHYSICIAN ASSISTANT

## 2023-12-21 PROCEDURE — 20610 DRAIN/INJ JOINT/BURSA W/O US: CPT | Mod: S$PBB,LT,, | Performed by: PHYSICIAN ASSISTANT

## 2023-12-21 PROCEDURE — 99024 POSTOP FOLLOW-UP VISIT: CPT | Mod: ,,, | Performed by: PHYSICIAN ASSISTANT

## 2023-12-21 PROCEDURE — 1159F MED LIST DOCD IN RCRD: CPT | Mod: CPTII,,, | Performed by: PHYSICIAN ASSISTANT

## 2023-12-21 PROCEDURE — 99024 PR POST-OP FOLLOW-UP VISIT: ICD-10-PCS | Mod: ,,, | Performed by: PHYSICIAN ASSISTANT

## 2023-12-21 PROCEDURE — 20610 DRAIN/INJ JOINT/BURSA W/O US: CPT | Mod: PBBFAC | Performed by: PHYSICIAN ASSISTANT

## 2023-12-21 PROCEDURE — 97110 THERAPEUTIC EXERCISES: CPT

## 2023-12-21 PROCEDURE — 1159F PR MEDICATION LIST DOCUMENTED IN MEDICAL RECORD: ICD-10-PCS | Mod: CPTII,,, | Performed by: PHYSICIAN ASSISTANT

## 2023-12-21 NOTE — PROGRESS NOTES
"[facility]   Physical Therapy Treatment Note      Name: Pedro Morales  Clinic Number: [id number]    Therapy Diagnosis:   Encounter Diagnoses   Name Primary?    Pain in lateral portion of left knee Yes    Decreased range of motion (ROM) of left knee      Physician: Filemon Jacobs MD    Visit Date: 12/21/2023    Physician Orders: PT Eval and Treat   Medical Diagnosis from Referral: Bucket-handle tear of medial meniscus of left knee as current injury, initial encounter  Evaluation Date: 12/18/2023  Authorization Period Expiration: 12/31/2023  Plan of Care Expiration: 3/18/24  Progress Note Due: 1/18/25  Visit # / Visits authorized: 1 / 8     FOTO: 40%  FOTO 2:   FOTO 3:  DC FOTO @: 74%      PTA Visit #: 0/5     Time In: 1020  Time Out: 1120  Total Billable Time: 60 minutes Patient treated 1:1 by PT and/or with PT tech Maddison supervision for the entirety of the treatment session.    Subjective     Pt reports: [place] [facility] [place] He still can't sleep through night due to pain but does feel like he is doing better. He is doing everything except icing. Still swollen. Not taking pain meds, just nsaids still.  He was compliant with home exercise program.  Response to previous treatment: improved pain   Functional change: improved gait    Pain: 5/10  Location: L knee    Objective      Objective Measures updated at progress report unless specified.     Treatment     Pedro received the treatments listed below:      *Billed as therex due to medicaid guidelines*     Pedro received therapeutic exercises to develop strength, endurance, ROM, flexibility, posture and core stabilization for 60 minutes including:     Heel prop 4# above knee 5' before and after session  Fat pad/Patellar mobs in varying degrees of flexion/extension - all directions  Extension overpressure  Extension hinge mob   Russian L quad 10" on/10" off  Quad sets 5'  Quad sets into " hyperextension with strap (towel roll under knee) 5'   S/L SLR 5'   Standing SLR 5'   Supine SLR 5'  EOM PROM knee flexion with PT  Heel slides with strap and board 30x     2 cm above patella   Left  42 cm   Right  41 cm         Patient Education and Home Exercises       Education provided:   - HEP  - infection prevention: keep incisions covered with water proof bandaids and do not get incisions wet. Recommended to leave leg out of shower until incisions are fully closed.  - prop ankle every waking hour to assist obtaining complete knee extension   - ice and elevate above heart level 3-5x/day  - perform 100 quad sets every waking hour  - do not sleep with knee flexed (I.e.putting something under knee)    Written Home Exercises Provided: Patient instructed to cont prior HEP. Exercises were reviewed and Pedro was able to demonstrate them prior to the end of the session.  Pedro demonstrated good  understanding of the education provided. See EMR under Patient Instructions for exercises provided during therapy sessions    Assessment     Pt presents still swollen distal quad (a pocket of hard fluid in suprapatellar pouch). His ROM and quad activation progressed today. L knee ROM 5-0-104 degrees. Pt was also able to perform SLR in both modified and supine today. His flexion is limited likely due to swelling, which is 2 cm difference circumferentially from unaffected side. I reached out to surgeon MA send him pictures and told him the situation. No response. I also referred patient across [place] to see ZAHIDA Cadena to take a look at knee swelling and fit him for Thigh high compression stocking.     Pedro Is progressing well towards his goals.   Pt prognosis is Excellent.     Pt will continue to benefit from skilled outpatient physical therapy to address the deficits listed in the problem list box on initial evaluation, provide pt/family education and to maximize pt's level of independence in the home and community  environment.     Pt's spiritual, cultural and educational needs considered and pt agreeable to plan of care and goals.     Anticipated barriers to physical therapy: none    Goals:   Short Term Goals: 6 weeks   1. Independent with HEP  2. Increase pain-free L knee extension ROM to >0 degrees   3. Increase pain-free L knee flexion ROM to >/=135 degrees   4. Improve gait (discharge AD) when safe  5. Ability to perform SLR with no lag     Long Term Goals: 10 weeks   1. Increase pain-free L knee ROM to WNL   2. Improve Quad strength to at least 3+/5   3. Pain-free gait with or without AD   4. Pain-free stairs    Plan     Continue with PT YURI Hernandez, PT

## 2023-12-21 NOTE — PROGRESS NOTES
CC: Left knee scope post op - 6 days (Outside surgeon)    Patient is a 23-year-old male who presents today for postoperative evaluation.  He is approximately 6 days status post left knee arthroscopic partial meniscectomy and chondroplasty with Dr. Vale at [facility].  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  His physical therapist reached out to me for concerns of significant swelling of the left knee.  He has been attending formal physical therapy here at [facility].  He denies any fevers, chills, nausea, vomiting, or drainage from incision sites.  His primary complaint of swelling that seems to be limiting his range of motion.  He is still ambulating with the assistance of crutches.  He has no longer taking pain medication.    PE:    There were no vitals taken for this visit.     Left knee:    Incisions clean/dry/intact  No sign of infection  Moderate swelling  Compartments soft  Neurovascular status intact in extremity    AROM 2 to 70 degrees  Decreased quad strength  3+ effusion    Assessment:  6 days s/p left knee arthroscopic partial meniscectomy and chondroplasty    Plan:  1.  Removed steri strips.  Wounds healing well    2.  70 cc of sanguinous fluid aspirated from the left knee.  See procedure note for details.    3.  Continue Physical therapy for quad, ROM, modalities.  HEP for ROM, knee, VMO and hip abductor strengthening.     4.  Pain level acceptable for first post op visit.  Wean off pain medicine by next visit if still taking    5. Follow-up as scheduled with surgeon in [place].      All questions were answered. Instructed patient to call with questions or concerns in the interim.       Medical Dictation software was used during the dictation of portions or the entirety of this medical record.  Phonetic or grammatic errors may exist due to the use of this software. For clarification, refer to the author of the document.

## 2023-12-21 NOTE — PROCEDURES
Large Joint Aspiration/Injection: L knee    Date/Time: 12/21/2023 11:45 AM    Performed by: Jaxon Morley PA-C  Authorized by: Jaxon Morley PA-C    Consent Done?:  Yes (Verbal)  Indications:  Joint swelling  Site marked: the procedure site was marked    Timeout: prior to procedure the correct patient, procedure, and site was verified    Prep: patient was prepped and draped in usual sterile fashion      Local anesthesia used?: Yes    Local anesthetic: naropin 0.2%  Anesthetic total (ml):  10      Details:  Needle Size:  18 G  Ultrasonic Guidance for needle placement?: No    Approach:  Superior  Location:  Knee  Site:  L knee  Aspirate amount (mL):  70  Aspirate:  Bloody  Patient tolerance:  Patient tolerated the procedure well with no immediate complications    Aspiration Procedure  A time out was performed, including verification of patient ID, procedure, site and side, availability of information and equipment, review of safety issues, and agreement with consent, the procedure site was marked.    After time out was performed, the patient was prepped aseptically with povidone-iodine swabsticks. Approximately 10 cc of 1% lidocaine plain was injected with a 25-gauge needle into the aspiration site without difficulty.  70cc's of sanguineous joint fluid were aspirated from the Superolateral  aspect of the left Knee Joint using an 18g x 1.5 needle in sterile fashion without complication. Bandage was applied.     Pedro Morales was reminded to rest with RICE for 48 hours post injection and to call the clinic immediately for any adverse side effects as explained in clinic today.

## 2023-12-27 ENCOUNTER — CLINICAL SUPPORT (OUTPATIENT)
Dept: REHABILITATION | Facility: HOSPITAL | Age: 23
End: 2023-12-27
Payer: MEDICAID

## 2023-12-27 DIAGNOSIS — M25.562 PAIN IN LATERAL PORTION OF LEFT KNEE: Primary | ICD-10-CM

## 2023-12-27 DIAGNOSIS — M25.662 DECREASED RANGE OF MOTION (ROM) OF LEFT KNEE: ICD-10-CM

## 2023-12-27 PROCEDURE — 97110 THERAPEUTIC EXERCISES: CPT

## 2023-12-27 NOTE — PROGRESS NOTES
"[facility]   Physical Therapy Treatment Note      Name: Pedro Morales  Clinic Number: [id number]    Therapy Diagnosis:   Encounter Diagnoses   Name Primary?    Pain in lateral portion of left knee Yes    Decreased range of motion (ROM) of left knee      Physician: Filemon Jacobs MD    Visit Date: 12/27/2023    Physician Orders: PT Eval and Treat   Medical Diagnosis from Referral: Bucket-handle tear of medial meniscus of left knee as current injury, initial encounter  Evaluation Date: 12/18/2023  Authorization Period Expiration: 12/31/2023  Plan of Care Expiration: 3/18/24  Progress Note Due: 1/18/25  Visit # / Visits authorized: 3 / 8     FOTO: 40%  FOTO 2:   FOTO 3:  DC FOTO @: 74%      PTA Visit #: 0/5     Time In: 800  Time Out: 910  Total Billable Time: 60 minutes Patient treated 1:1 by PT and/or with PT tech Maddison supervision for the entirety of the treatment session.    Subjective     Pt reports: PA drained 77 cc of fluid from knee last week and immediately he felt better. His flexion has improved and he has no pain.   He was compliant with home exercise program.  Response to previous treatment: improved pain   Functional change: improved pain, gait, flexion     Pain: 0/10  Location: L knee    Objective      Objective Measures updated at progress report unless specified.     Treatment     Pedro received the treatments listed below:      *Billed as therex due to medicaid guidelines*     Pedro received therapeutic exercises to develop strength, endurance, ROM, flexibility, posture and core stabilization for 60 minutes including:   10 min bike  Heel prop 4# above knee 5' before session  Fat pad/Patellar mobs in varying degrees of flexion/extension - all directions  Extension overpressure  Extension hinge mob   Quad sets 5'  Quad sets into hyperextension with strap (towel roll under knee) 10" x 10  SLR 3" 3 x 8  Upright SLR 3 x 8  TKE BTB 3" x 30  Shuttle leg press    DL 75# 3 x " 12   SL 50# 3 x 12  Heel slides with strap and board 30x     2 cm above patella   Left  42 cm   Right  41 cm       Patient Education and Home Exercises       Education provided:   - HEP  - infection prevention: keep incisions covered with water proof bandaids and do not get incisions wet.  - Recommended to leave leg out of shower until incisions are fully closed.  - prop ankle every waking hour to assist obtaining complete knee extension   - ice and elevate above heart level 3-5x/day  - perform 100 quad sets every waking hour  - do not sleep with knee flexed (I.e.putting something under knee)    Written Home Exercises Provided: Patient instructed to cont prior HEP. Exercises were reviewed and Pedro was able to demonstrate them prior to the end of the session.  Ceebreezy demonstrated good  understanding of the education provided. See EMR under Patient Instructions for exercises provided during therapy sessions    Assessment     Pt presents s/p draining 70 cc of fluid with ZAHIDA Cadena. He presents with improved swelling, patellar/fat pad mobility, knee ROM, and quad activation. He was able to progress today, though he struggled to maintain full TKE in SLR and required cueing. Progressing well. Obtained Full range of motion  Updated HEP .    Pedro Is progressing well towards his goals.   Pt prognosis is Excellent.     Pt will continue to benefit from skilled outpatient physical therapy to address the deficits listed in the problem list box on initial evaluation, provide pt/family education and to maximize pt's level of independence in the home and community environment.     Pt's spiritual, cultural and educational needs considered and pt agreeable to plan of care and goals.     Anticipated barriers to physical therapy: none    Goals:   Short Term Goals: 6 weeks   1. Independent with HEP - MET   2. Increase pain-free L knee extension ROM to >0 degrees - MET   3. Increase pain-free L knee flexion ROM to >/=135 degrees -  MET   4. Improve gait (discharge AD) when safe - MET   5. Ability to perform SLR with no lag - partially     Long Term Goals: 10 weeks   1. Increase pain-free L knee ROM to WNL   2. Improve Quad strength to at least 3+/5   3. Pain-free gait with or without AD   4. Pain-free stairs    Plan     Continue with PT YURI Hernandez, PT

## 2023-12-29 ENCOUNTER — CLINICAL SUPPORT (OUTPATIENT)
Dept: REHABILITATION | Facility: HOSPITAL | Age: 23
End: 2023-12-29
Payer: MEDICAID

## 2023-12-29 DIAGNOSIS — M25.562 PAIN IN LATERAL PORTION OF LEFT KNEE: Primary | ICD-10-CM

## 2023-12-29 DIAGNOSIS — M25.662 DECREASED RANGE OF MOTION (ROM) OF LEFT KNEE: ICD-10-CM

## 2023-12-29 PROCEDURE — 97110 THERAPEUTIC EXERCISES: CPT

## 2023-12-29 NOTE — PROGRESS NOTES
"[facility]   Physical Therapy Treatment Note      Name: Pedro Morales  Clinic Number: [id number]    Therapy Diagnosis:   Encounter Diagnoses   Name Primary?    Pain in lateral portion of left knee Yes    Decreased range of motion (ROM) of left knee      Physician: Filemon Jacobs MD    Visit Date: 12/29/2023    Physician Orders: PT Eval and Treat   Medical Diagnosis from Referral: Bucket-handle tear of medial meniscus of left knee as current injury, initial encounter  Evaluation Date: 12/18/2023  Authorization Period Expiration: 12/31/2023  Plan of Care Expiration: 3/18/24  Progress Note Due: 1/18/25  Visit # / Visits authorized: 4 / 8     FOTO: 40%  FOTO 2:   FOTO 3:  DC FOTO @: 74%      PTA Visit #: 0/5     Time In: 805  Time Out: 915  Total Billable Time: 70 minutes Patient treated 1:1 by PT and/or with PT tech Maddison supervision for the entirety of the treatment session.    Subjective     Pt reports: feeling a lot better. Has pain with stairs and when he sleeps a certain way or twists on accident at night.   He was compliant with home exercise program.  Response to previous treatment: improved pain   Functional change: improved pain, gait, flexion     Pain: 0/10  Location: L knee    Objective      Objective Measures updated at progress report unless specified.     L ROM: 6-0-138 DEGREES    Treatment     Pedro received the treatments listed below:      *Billed as therex due to medicaid guidelines*     Pedro received therapeutic exercises to develop strength, endurance, ROM, flexibility, posture and core stabilization for 70 minutes including:   10 min bike  Heel prop 4# above knee 5' before session  Fat pad/Patellar mobs in varying degrees of flexion/extension - all directions  Extension overpressure  Extension hinge mob   Quad sets into hyperextension with strap (no towel) 5" x 30  Quad sets into hyperextension (with towel) 10" x 10  SLR 3" 2 x 10   Upright SLR 2 x 10  SAQ 3# 3" 2 x " "10  LAQ 0# 3" 2 x 10  Shuttle leg press    DL 75# 3 x 12   SL 50# 3 x 12  Heel slides with strap and board 30x        Patient Education and Home Exercises       Education provided:   - HEP  - infection prevention: keep incisions covered with water proof bandaids and do not get incisions wet.  - Recommended to leave leg out of shower until incisions are fully closed.  - prop ankle every waking hour to assist obtaining complete knee extension   - ice and elevate above heart level 3-5x/day  - perform 100 quad sets every waking hour  - do not sleep with knee flexed (I.e.putting something under knee)    Written Home Exercises Provided: Patient instructed to cont prior HEP. Exercises were reviewed and Pedro was able to demonstrate them prior to the end of the session.  Pedro demonstrated good  understanding of the education provided. See EMR under Patient Instructions for exercises provided during therapy sessions    Assessment     Pt challenged to obtain TKE in SLR and required cueing for it. Mild pain with squats diminished with shuttle. Emphasized to work on TKE with open chain     Pedro Is progressing well towards his goals.   Pt prognosis is Excellent.     Pt will continue to benefit from skilled outpatient physical therapy to address the deficits listed in the problem list box on initial evaluation, provide pt/family education and to maximize pt's level of independence in the home and community environment.     Pt's spiritual, cultural and educational needs considered and pt agreeable to plan of care and goals.     Anticipated barriers to physical therapy: none    Goals:   Short Term Goals: 6 weeks   1. Independent with HEP - MET   2. Increase pain-free L knee extension ROM to >0 degrees - MET   3. Increase pain-free L knee flexion ROM to >/=135 degrees - MET   4. Improve gait (discharge AD) when safe - MET   5. Ability to perform SLR with no lag - partially     Long Term Goals: 10 weeks   1. Increase pain-free L " knee ROM to WNL   2. Improve Quad strength to at least 3+/5   3. Pain-free gait with or without AD   4. Pain-free stairs    Plan     Continue with PT YURI Hernandez PT

## 2024-01-05 ENCOUNTER — CLINICAL SUPPORT (OUTPATIENT)
Dept: REHABILITATION | Facility: HOSPITAL | Age: 24
End: 2024-01-05
Payer: MEDICAID

## 2024-01-05 DIAGNOSIS — M25.662 DECREASED RANGE OF MOTION (ROM) OF LEFT KNEE: ICD-10-CM

## 2024-01-05 DIAGNOSIS — M25.562 PAIN IN LATERAL PORTION OF LEFT KNEE: Primary | ICD-10-CM

## 2024-01-05 PROCEDURE — 97110 THERAPEUTIC EXERCISES: CPT

## 2024-01-05 NOTE — PROGRESS NOTES
"[facility]   Physical Therapy Treatment Note      Name: Pedro Morales  Clinic Number: [id number]    Therapy Diagnosis:   No diagnosis found.    Physician: Filemon Jacobs MD    Visit Date: 1/5/2024    Physician Orders: PT Eval and Treat   Medical Diagnosis from Referral: Bucket-handle tear of medial meniscus of left knee as current injury, initial encounter  Evaluation Date: 12/18/2023  Authorization Period Expiration: 12/31/2023  Plan of Care Expiration: 3/18/24  Progress Note Due: 1/18/25  Visit # / Visits authorized: 4 + 1 / 8     FOTO: 40%  FOTO 2:   FOTO 3:  DC FOTO @: 74%      PTA Visit #: 0/5     Time In: 803  Time Out: 903  Total Billable Time: 60 minutes Patient treated 1:1 by PT and/or with PT lsy Washburn supervision for the entirety of the treatment session.    Subjective     Pt reports: no pain. Does not know if he's leaving next week yet, has not been doing his exercises daily, particularly SLR.  He was NOT compliant with home exercise program.  Response to previous treatment: improved pain   Functional change: improved pain, gait, flexion     Pain: 0/10  Location: L knee    Objective      Objective Measures updated at progress report unless specified.     L PROM: 5-0-135 DEGREES    Treatment     Pedro received the treatments listed below:      *Billed as therex due to medicaid guidelines*     Pedro received therapeutic exercises to develop strength, endurance, ROM, flexibility, posture and core stabilization for 60 minutes including:   10 min bike  Heel prop 4# above knee 5' before session  Fat pad/Patellar mobs in varying degrees of flexion/extension - all directions  Extension overpressure  Extension hinge mob   Quad sets into hyperextension with strap (no towel) 10" x 15  SLR 4 x 8 ~ max cuing for lag  Standing SLR EOM 3" 3 x 8  TKE GTB 3" x 30  Upright SLR 5 x 5  SAQ 3# 3" 3 x 10  Shuttle leg press    SL 32# 3 x 12        Patient Education and Home Exercises   "     Education provided:   - HEP daily   - compliance for progres    Written Home Exercises Provided: Patient instructed to cont prior HEP. Exercises were reviewed and Pedro was able to demonstrate them prior to the end of the session.  Pedro demonstrated good  understanding of the education provided. See EMR under Patient Instructions for exercises provided during therapy sessions    Assessment     Pt 3 weeks post-op medial meniscectomy. He has missed several visits without notification. He admittedly has not been compliant with his HEP, as a result has a quad lag in his straight leg raise, presented lacking extension ROM, and is significantly challenged by quad strengthening. We were supposed to progress to BFR today but he was unable to perform 30 straight leg raises without a lag. Emphasized importance of compliance daily in progressing and returning to sport. Updated and issued his HEP.      Pedro Is progressing well towards his goals.   Pt prognosis is Excellent.     Pt will continue to benefit from skilled outpatient physical therapy to address the deficits listed in the problem list box on initial evaluation, provide pt/family education and to maximize pt's level of independence in the home and community environment.     Pt's spiritual, cultural and educational needs considered and pt agreeable to plan of care and goals.     Anticipated barriers to physical therapy: none    Goals:   Short Term Goals: 6 weeks   1. Independent with HEP - MET   2. Increase pain-free L knee extension ROM to >0 degrees - MET   3. Increase pain-free L knee flexion ROM to >/=135 degrees - MET   4. Improve gait (discharge AD) when safe - MET   5. Ability to perform SLR with no lag - partially     Long Term Goals: 10 weeks   1. Increase pain-free L knee ROM to WNL   2. Improve Quad strength to at least 3+/5   3. Pain-free gait with or without AD   4. Pain-free stairs    Plan     Continue with HEP    Stephanie Hernandez PT